# Patient Record
Sex: FEMALE | Race: WHITE | NOT HISPANIC OR LATINO | Employment: UNEMPLOYED | URBAN - METROPOLITAN AREA
[De-identification: names, ages, dates, MRNs, and addresses within clinical notes are randomized per-mention and may not be internally consistent; named-entity substitution may affect disease eponyms.]

---

## 2017-12-11 ENCOUNTER — HOSPITAL ENCOUNTER (EMERGENCY)
Facility: HOSPITAL | Age: 37
Discharge: HOME | End: 2017-12-11
Attending: EMERGENCY MEDICINE
Payer: MEDICAID

## 2017-12-11 VITALS
RESPIRATION RATE: 20 BRPM | HEIGHT: 59 IN | WEIGHT: 110 LBS | TEMPERATURE: 98 F | SYSTOLIC BLOOD PRESSURE: 113 MMHG | HEART RATE: 80 BPM | DIASTOLIC BLOOD PRESSURE: 68 MMHG | BODY MASS INDEX: 22.18 KG/M2 | OXYGEN SATURATION: 99 %

## 2017-12-11 DIAGNOSIS — L02.416 ABSCESS OF LEFT THIGH: Primary | ICD-10-CM

## 2017-12-11 PROCEDURE — 63600175 PHARM REV CODE 636 W HCPCS: Performed by: PHYSICIAN ASSISTANT

## 2017-12-11 PROCEDURE — 90715 TDAP VACCINE 7 YRS/> IM: CPT | Performed by: PHYSICIAN ASSISTANT

## 2017-12-11 PROCEDURE — 10060 I&D ABSCESS SIMPLE/SINGLE: CPT

## 2017-12-11 PROCEDURE — 87070 CULTURE OTHR SPECIMN AEROBIC: CPT

## 2017-12-11 PROCEDURE — 99284 EMERGENCY DEPT VISIT MOD MDM: CPT | Mod: 25

## 2017-12-11 PROCEDURE — 87077 CULTURE AEROBIC IDENTIFY: CPT

## 2017-12-11 PROCEDURE — 25000003 PHARM REV CODE 250: Performed by: PHYSICIAN ASSISTANT

## 2017-12-11 PROCEDURE — 87186 SC STD MICRODIL/AGAR DIL: CPT

## 2017-12-11 PROCEDURE — 90471 IMMUNIZATION ADMIN: CPT | Performed by: PHYSICIAN ASSISTANT

## 2017-12-11 RX ORDER — LIDOCAINE HYDROCHLORIDE 10 MG/ML
10 INJECTION INFILTRATION; PERINEURAL
Status: COMPLETED | OUTPATIENT
Start: 2017-12-11 | End: 2017-12-11

## 2017-12-11 RX ORDER — SULFAMETHOXAZOLE AND TRIMETHOPRIM 800; 160 MG/1; MG/1
2 TABLET ORAL 2 TIMES DAILY
Qty: 28 TABLET | Refills: 0 | Status: SHIPPED | OUTPATIENT
Start: 2017-12-11 | End: 2017-12-18

## 2017-12-11 RX ORDER — ACETAMINOPHEN 500 MG
500 TABLET ORAL EVERY 4 HOURS PRN
Qty: 20 TABLET | Refills: 0 | Status: SHIPPED | OUTPATIENT
Start: 2017-12-11 | End: 2017-12-16

## 2017-12-11 RX ORDER — SULFAMETHOXAZOLE AND TRIMETHOPRIM 800; 160 MG/1; MG/1
2 TABLET ORAL
Status: COMPLETED | OUTPATIENT
Start: 2017-12-11 | End: 2017-12-11

## 2017-12-11 RX ORDER — IBUPROFEN 600 MG/1
600 TABLET ORAL EVERY 6 HOURS PRN
Qty: 20 TABLET | Refills: 0 | Status: SHIPPED | OUTPATIENT
Start: 2017-12-11 | End: 2017-12-16

## 2017-12-11 RX ORDER — OXYCODONE AND ACETAMINOPHEN 5; 325 MG/1; MG/1
1 TABLET ORAL
Status: COMPLETED | OUTPATIENT
Start: 2017-12-11 | End: 2017-12-11

## 2017-12-11 RX ADMIN — LIDOCAINE HYDROCHLORIDE 10 ML: 10 INJECTION, SOLUTION INFILTRATION; PERINEURAL at 09:12

## 2017-12-11 RX ADMIN — CLOSTRIDIUM TETANI TOXOID ANTIGEN (FORMALDEHYDE INACTIVATED), CORYNEBACTERIUM DIPHTHERIAE TOXOID ANTIGEN (FORMALDEHYDE INACTIVATED), BORDETELLA PERTUSSIS TOXOID ANTIGEN (GLUTARALDEHYDE INACTIVATED), BORDETELLA PERTUSSIS FILAMENTOUS HEMAGGLUTININ ANTIGEN (FORMALDEHYDE INACTIVATED), BORDETELLA PERTUSSIS PERTACTIN ANTIGEN, AND BORDETELLA PERTUSSIS FIMBRIAE 2/3 ANTIGEN 0.5 ML: 5; 2; 2.5; 5; 3; 5 INJECTION, SUSPENSION INTRAMUSCULAR at 09:12

## 2017-12-11 RX ADMIN — SULFAMETHOXAZOLE AND TRIMETHOPRIM 2 TABLET: 800; 160 TABLET ORAL at 11:12

## 2017-12-11 RX ADMIN — OXYCODONE HYDROCHLORIDE AND ACETAMINOPHEN 1 TABLET: 5; 325 TABLET ORAL at 11:12

## 2017-12-12 NOTE — ED PROVIDER NOTES
Encounter Date: 2017    SCRIBE #1 NOTE: IJenna, am scribing for, and in the presence of,  Michelle Velasco PA-C . I have scribed the following portions of the note - Other sections scribed: HPI and ROS .       History     Chief Complaint   Patient presents with    Abscess     abscess to left upper thigh x 1 wk     CC:Abscess.   History obtained from patient.  Pt arrived via personal transportation.     HPI: This 37 y.o. Female with history of previous IVDU presents to the ED for evaluation of an abscess to the left anterior thigh x 1 week. Associated pain is constant and moderately severe (8/10). The patient denies expression of any purulent drainage from the area. She attempted to drain the abscess 2 days ago by using a sewing needle with no success. She denies fever, chills, nausea, or vomiting. She reports no further symptoms. She has taken no medication for pain management. No alleviating factors. Date of last tetanus vaccination is unknown. PCP is Dr. Cole, whom she last saw 2 years ago. She has had one other abscess in the past located on her right arm.       The history is provided by the patient. No  was used.     Review of patient's allergies indicates:   Allergen Reactions    Penicillins Rash     Past Medical History:   Diagnosis Date    Gallstones      Past Surgical History:   Procedure Laterality Date     SECTION      x3    FRACTURE SURGERY      left arm with hardware     History reviewed. No pertinent family history.  Social History   Substance Use Topics    Smoking status: Current Every Day Smoker     Packs/day: 1.00     Types: Cigarettes    Smokeless tobacco: Never Used    Alcohol use No     Review of Systems   Constitutional: Negative for chills and fever.   HENT: Negative for rhinorrhea.    Eyes: Negative for redness.   Respiratory: Negative for cough.    Cardiovascular: Negative for chest pain.   Gastrointestinal: Negative for nausea and  vomiting.   Genitourinary: Negative for difficulty urinating and dysuria.   Musculoskeletal:        (+) pain associated with Left thigh abscess      Skin:        (+) L thigh abscess     Neurological: Negative for dizziness and weakness.       Physical Exam     Initial Vitals [12/11/17 1754]   BP Pulse Resp Temp SpO2   (!) 138/104 100 20 98.7 °F (37.1 °C) 99 %      MAP       115.33         Physical Exam    Nursing note and vitals reviewed.  Constitutional: She appears well-developed and well-nourished.   HENT:   Head: Normocephalic.   Right Ear: External ear normal.   Left Ear: External ear normal.   Nose: Nose normal.   Mouth/Throat: Oropharynx is clear and moist.   Eyes: Conjunctivae are normal.   Cardiovascular: Normal rate and regular rhythm. Exam reveals no gallop and no friction rub.    No murmur heard.  Pulmonary/Chest: Breath sounds normal. She has no wheezes. She has no rhonchi. She has no rales.   Abdominal: Soft. Bowel sounds are normal. She exhibits no distension. There is no tenderness. There is no rebound, no guarding, no tenderness at McBurney's point and negative Canela's sign.   Musculoskeletal: Normal range of motion.   Lymphadenopathy:     She has no cervical adenopathy.   Neurological: She is alert. She has normal strength. No cranial nerve deficit or sensory deficit.   Skin: Skin is warm and dry.   4x5 round area of fluctuance with surrounding erythema and induration to left anterior thigh with central round scabbed region   Psychiatric: She has a normal mood and affect.             ED Course   I & D - Incision and Drainage  Date/Time: 12/11/2017 10:20 PM  Location procedure was performed: [facility] EMERGENCY DEPARTMENT  Performed by: DEONTE IVERSON  Authorized by: DEEPTI ROCA   Pre-operative diagnosis: left anterior thigh abscess  Post-operative diagnosis: left anterior thigh abscess   Type: abscess  Body area: lower extremity  Anesthesia: local infiltration    Anesthesia:  Local  Anesthetic: lidocaine 1% without epinephrine  Anesthetic total: 10 mL  Description of findings: 4x5 cm area of fluctuance with surrounding erythema and induration   Scalpel size: 10  Incision type: single straight  Complexity: simple  Drainage: purulent and  bloody  Drainage amount: copious  Wound treatment: incision,  drainage and  wound packed  Packing material: 1/2 in iodoform gauze  Complications: No  Estimated blood loss (mL): 10  Specimens: Yes  Implants: No  Patient tolerance: Patient tolerated the procedure well with no immediate complications        Labs Reviewed   CULTURE, AEROBIC  (SPECIFY SOURCE)             Medical Decision Making:   Initial Assessment:   Patient is 37-year-old female with history of IV drug use who presents for evaluation of 1 week history of abscess to left anterior thigh.  She denies fever, chills, nausea, vomiting or purulent drainage.  Patient attempted to incise and drain the area with sewing needle 2 days ago.   Differential Diagnosis:   Abscess, cellulitis, sepsis  ED Management:  Patient is afebrile, well-appearing in acute distress.  Physical exam findings this is all about.  Abscess incision and drainage performed per procedure note.  Patient given first dose of Bactrim in the ED as well as Percocet for pain.  Tetanus was updated.  Patient discharged in stable condition with Bactrim and instructions to follow-up in 2 days either her primary care or this facility for wound check.  Return to ER sooner if you develop fever, worsening pain, redness, swelling nausea, vomiting or as needed.  Discussed this patient with Dr. Woody who also evaluated pt face to face and she agrees with assessment and plan.             Scribe Attestation:   Scribe #1: I performed the above scribed service and the documentation accurately describes the services I performed. I attest to the accuracy of the note.    Attending Attestation:           Physician Attestation for Scribe:  Physician Attestation  Statement for Scribe #1: I, Michelle Velasco PA-C , reviewed documentation, as scribed by Jenna Smiley  in my presence, and it is both accurate and complete.                 ED Course      Clinical Impression:   The encounter diagnosis was Abscess of left thigh.                           Michelle Velasco PA-C  12/12/17 0242

## 2017-12-12 NOTE — ED TRIAGE NOTES
Pt. Reports that she has been having an abscess for about 1 week. Pt. Denies any fevers/ chills. Pt. Noted to have an abscess to her left upper leg. Pain is a 8/10 on the pain scale.

## 2017-12-12 NOTE — DISCHARGE INSTRUCTIONS
Take full course of Bactrim as prescribed. Keep area clean, dry and covered.     Follow up with primary care or this ED in 2 days for wound check.     Return to ER immediately if you develop fever, worsening pain, redness, swelling, nausea, vomiting or as needed.

## 2017-12-13 LAB — BACTERIA SPEC AEROBE CULT: NORMAL

## 2023-09-12 DIAGNOSIS — M79.641 RIGHT HAND PAIN: Primary | ICD-10-CM
